# Patient Record
Sex: MALE | Race: BLACK OR AFRICAN AMERICAN | Employment: UNEMPLOYED | URBAN - METROPOLITAN AREA
[De-identification: names, ages, dates, MRNs, and addresses within clinical notes are randomized per-mention and may not be internally consistent; named-entity substitution may affect disease eponyms.]

---

## 2023-03-11 ENCOUNTER — HOSPITAL ENCOUNTER (EMERGENCY)
Age: 28
Discharge: HOME | End: 2023-03-11
Attending: STUDENT IN AN ORGANIZED HEALTH CARE EDUCATION/TRAINING PROGRAM

## 2023-03-11 VITALS
BODY MASS INDEX: 33.32 KG/M2 | SYSTOLIC BLOOD PRESSURE: 168 MMHG | RESPIRATION RATE: 16 BRPM | HEIGHT: 65 IN | DIASTOLIC BLOOD PRESSURE: 108 MMHG | OXYGEN SATURATION: 99 % | TEMPERATURE: 97.2 F | HEART RATE: 62 BPM | WEIGHT: 200 LBS

## 2023-03-11 DIAGNOSIS — Z76.89 ENCOUNTER FOR PSYCHIATRIC ASSESSMENT: Primary | ICD-10-CM

## 2023-03-11 LAB
ACETAMINOPHEN LEVEL: <5 MCG/ML (ref 10–30)
ALBUMIN SERPL-MCNC: 4.3 G/DL (ref 3.5–5.2)
ALP BLD-CCNC: 59 U/L (ref 40–129)
ALT SERPL-CCNC: 25 U/L (ref 0–40)
AMPHETAMINE SCREEN, URINE: NOT DETECTED
ANION GAP SERPL CALCULATED.3IONS-SCNC: 7 MMOL/L (ref 7–16)
AST SERPL-CCNC: 21 U/L (ref 0–39)
BARBITURATE SCREEN URINE: NOT DETECTED
BASOPHILS ABSOLUTE: 0.04 E9/L (ref 0–0.2)
BASOPHILS RELATIVE PERCENT: 0.4 % (ref 0–2)
BENZODIAZEPINE SCREEN, URINE: NOT DETECTED
BILIRUB SERPL-MCNC: 0.6 MG/DL (ref 0–1.2)
BUN BLDV-MCNC: 13 MG/DL (ref 6–20)
CALCIUM SERPL-MCNC: 9.6 MG/DL (ref 8.6–10.2)
CANNABINOID SCREEN URINE: POSITIVE
CHLORIDE BLD-SCNC: 109 MMOL/L (ref 98–107)
CO2: 26 MMOL/L (ref 22–29)
COCAINE METABOLITE SCREEN URINE: NOT DETECTED
CREAT SERPL-MCNC: 1.1 MG/DL (ref 0.7–1.2)
EOSINOPHILS ABSOLUTE: 0.73 E9/L (ref 0.05–0.5)
EOSINOPHILS RELATIVE PERCENT: 6.6 % (ref 0–6)
ETHANOL: <10 MG/DL (ref 0–0.08)
FENTANYL SCREEN, URINE: NOT DETECTED
GFR SERPL CREATININE-BSD FRML MDRD: >60 ML/MIN/1.73
GLUCOSE BLD-MCNC: 95 MG/DL (ref 74–99)
HCT VFR BLD CALC: 45.8 % (ref 37–54)
HEMOGLOBIN: 15.6 G/DL (ref 12.5–16.5)
IMMATURE GRANULOCYTES #: 0.03 E9/L
IMMATURE GRANULOCYTES %: 0.3 % (ref 0–5)
INFLUENZA A: NOT DETECTED
INFLUENZA B: NOT DETECTED
LYMPHOCYTES ABSOLUTE: 2.08 E9/L (ref 1.5–4)
LYMPHOCYTES RELATIVE PERCENT: 18.7 % (ref 20–42)
Lab: ABNORMAL
MCH RBC QN AUTO: 31.9 PG (ref 26–35)
MCHC RBC AUTO-ENTMCNC: 34.1 % (ref 32–34.5)
MCV RBC AUTO: 93.7 FL (ref 80–99.9)
METHADONE SCREEN, URINE: NOT DETECTED
MONOCYTES ABSOLUTE: 0.63 E9/L (ref 0.1–0.95)
MONOCYTES RELATIVE PERCENT: 5.7 % (ref 2–12)
NEUTROPHILS ABSOLUTE: 7.59 E9/L (ref 1.8–7.3)
NEUTROPHILS RELATIVE PERCENT: 68.3 % (ref 43–80)
OPIATE SCREEN URINE: NOT DETECTED
OXYCODONE URINE: NOT DETECTED
PDW BLD-RTO: 12.5 FL (ref 11.5–15)
PHENCYCLIDINE SCREEN URINE: NOT DETECTED
PLATELET # BLD: 245 E9/L (ref 130–450)
PMV BLD AUTO: 10.5 FL (ref 7–12)
POTASSIUM SERPL-SCNC: 4.3 MMOL/L (ref 3.5–5)
RBC # BLD: 4.89 E12/L (ref 3.8–5.8)
SALICYLATE, SERUM: <0.3 MG/DL (ref 0–30)
SARS-COV-2 RNA, RT PCR: NOT DETECTED
SODIUM BLD-SCNC: 142 MMOL/L (ref 132–146)
TOTAL PROTEIN: 7.5 G/DL (ref 6.4–8.3)
TRICYCLIC ANTIDEPRESSANTS SCREEN SERUM: NEGATIVE NG/ML
WBC # BLD: 11.1 E9/L (ref 4.5–11.5)

## 2023-03-11 PROCEDURE — 80053 COMPREHEN METABOLIC PANEL: CPT

## 2023-03-11 PROCEDURE — 82077 ASSAY SPEC XCP UR&BREATH IA: CPT

## 2023-03-11 PROCEDURE — 87636 SARSCOV2 & INF A&B AMP PRB: CPT

## 2023-03-11 PROCEDURE — 99284 EMERGENCY DEPT VISIT MOD MDM: CPT

## 2023-03-11 PROCEDURE — 80307 DRUG TEST PRSMV CHEM ANLYZR: CPT

## 2023-03-11 PROCEDURE — 80179 DRUG ASSAY SALICYLATE: CPT

## 2023-03-11 PROCEDURE — 80143 DRUG ASSAY ACETAMINOPHEN: CPT

## 2023-03-11 PROCEDURE — 85025 COMPLETE CBC W/AUTO DIFF WBC: CPT

## 2023-03-11 PROCEDURE — 93005 ELECTROCARDIOGRAM TRACING: CPT | Performed by: PHYSICIAN ASSISTANT

## 2023-03-11 RX ORDER — FLUOXETINE 10 MG/1
10 TABLET, FILM COATED ORAL DAILY
COMMUNITY

## 2023-03-11 ASSESSMENT — PAIN - FUNCTIONAL ASSESSMENT: PAIN_FUNCTIONAL_ASSESSMENT: NONE - DENIES PAIN

## 2023-03-11 NOTE — ED NOTES
Department of Emergency Medicine  FIRST PROVIDER TRIAGE NOTE             Independent MLP           3/11/23  4:50 PM EST    Date of Encounter: 3/11/23   MRN: [id number]      HPI: Jacinda Cottrell is a 32 y.o. male who presents to the ED for Other (Pt [facility]. Pt dx w/schizo-effective disorder and bipolar disorder. )     Pt presenting with medical clearance for [facility]. No SI or HI    ROS: Negative for cp or sob. PE: Gen Appearance/Constitutional: alert  HEENT: NC/NT. PERRLA,  Airway patent. Initial Plan of Care: All treatment areas with department are currently occupied. Plan to order/Initiate the following while awaiting opening in ED: labs and EKG.   Initiate Treatment-Testing, Proceed toTreatment Area When Bed Available for ED Attending/MLP to Continue Care    Electronically signed by Kaitlynn Eagle PA-C   DD: 3/11/23      Kaitlynn Eagle PA-C  03/11/23 1809

## 2023-03-11 NOTE — ED PROVIDER NOTES
[address]        Pt Name: China Spence  MRN: [id number]  [place] 1995  Date of evaluation: 3/11/2023  Provider: Cynthia Carias DO  PCP: No primary care provider on file. Note Started: 5:23 PM EST 3/11/23    CHIEF COMPLAINT       Chief Complaint   Patient presents with    Other     Pt needs [facility]. Pt dx w/schizo-effective disorder and bipolar disorder. HISTORY OF PRESENT ILLNESS: 1 or more Elements   History From: Patient    Limitations to history : None    China Spence is a 32 y.o. male with past medical history of bipolar and schizophrenia who presents to the emergency department for psychiatric evaluation. Patient states that he is trying to get medically cleared in order to enter a men shelter nearby. Patient states that he currently resides in [facility] and just moved here. Patient does have a history of mental health disorders and needed clearance in order to stay at the shelter. Patient is denying any SI, HI or auditory/visual hallucinations. Patient is on medication currently and states that he takes them as prescribed. Patient is denying any medical symptoms including nausea, vomiting, fever, chills, headache, lightheadedness, syncope, cough, congestion, sore throat, urinary symptoms, abdominal pain, urinary symptoms, constipation or diarrhea. On initial assessment, patient is well-appearing and in no acute distress. He is very cooperative on exam.    Nursing Notes were all reviewed and agreed with or any disagreements were addressed in the HPI.    ROS:   Pertinent positives and negatives are stated within HPI, all other systems reviewed and are negative.    --------------------------------------------- PAST HISTORY ---------------------------------------------  Past Medical History:  has no past medical history on file.     Past Surgical History:  has no past surgical history on file. Social History:  reports that he has been smoking cigarettes. He has never used smokeless tobacco. He reports current drug use. Drug: Marijuana Tito Blow). He reports that he does not drink alcohol. Family History: family history is not on file. The patients home medications have been reviewed. Allergies: Haldol [haloperidol], Pcn [penicillins], and Risperidone and related    ---------------------------------------------------PHYSICAL EXAM--------------------------------------    Constitutional/General: Alert and oriented x3, well appearing, non toxic in NAD  Head: Normocephalic and atraumatic  Mouth: Oropharynx clear, handling secretions, no trismus  Neck: Supple, full ROM,  Pulmonary: Lungs clear to auscultation bilaterally, no wheezes, rales, or rhonchi. Not in respiratory distress  Cardiovascular:  Regular rate. Regular rhythm. No murmurs  Chest: no chest wall tenderness  Abdomen: Soft. Non tender. Non distended. No rebound, guarding, or rigidity. No pulsatile masses appreciated. Musculoskeletal: Moves all extremities x 4. Warm and well perfused, no clubbing, cyanosis, or edema. Capillary refill <3 seconds  Skin: warm and dry. No rashes. Neurologic: GCS 15, no gross focal neurologic deficits  Psych: Normal Affect    -------------------------------------------------- RESULTS -------------------------------------------------  I have personally reviewed all laboratory and imaging results for this patient. Results are listed below.      LABS:  Results for orders placed or performed during the hospital encounter of 03/11/23   EKG 12 Lead   Result Value Ref Range    Ventricular Rate 55 BPM    Atrial Rate 55 BPM    P-R Interval 94 ms    QRS Duration 92 ms    Q-T Interval 414 ms    QTc Calculation (Bazett) 396 ms    P Axis 57 degrees    R Axis 88 degrees    T Axis 6 degrees       RADIOLOGY:   Interpretation per the Radiologist below, if available at the time of this note:    No orders to display     No results found. No results found. See preliminary interpretation by me below. EKG: This EKG is signed and interpreted by me. Sinus bradycardia with ventricular rate of 55 bpm.  Short TN interval.  QTc not prolonged. No evidence of acute STEMI. Nonspecific T wave abnormalities with inversions in the lateral leads. No previous EKG for comparison. ------------------------- NURSING NOTES AND VITALS REVIEWED ---------------------------   The nursing notes within the ED encounter and vital signs as below have been reviewed by myself. BP (!) 168/108   Pulse 62   Temp 97.2 °F (36.2 °C)   Resp 16   Ht 5' 5\" (1.651 m)   Wt 200 lb (90.7 kg)   SpO2 99%   BMI 33.28 kg/m²   Oxygen Saturation Interpretation: Normal    The patients available past medical records and past encounters were reviewed. ------------------------------ ED COURSE/MEDICAL DECISION MAKING----------------------  Medications - No data to display    Medical Decision Making/Differential Diagnosis:    CC/HPI Summary, Pertinent Physical Exam Findings, Social Determinants of health, Records Reviewed, DDx, testing done/not done, ED Course, Reassessment, disposition considerations/shared decision making with patient, consults, disposition:        Medical Decision Making:   I, Dr. Justino Ty am the resident physician of record. History From: Patient    Limitations to history : None     Delmer Ryan is a 32 y.o. male who presents to the ED for psychiatric evaluation. Vital signs upon arrival BP (!) 168/108   Pulse 62   Temp 97.2 °F (36.2 °C)   Resp 16   Ht 5' 5\" (1.651 m)   Wt 200 lb (90.7 kg)   SpO2 99%   BMI 33.28 kg/m²     On initial evaluation, patient is nontoxic-appearing, afebrile, hemodynamically stable and in no acute distress. Initial vitals within normal limits.  Differential diagnosis includes but is not limited drug/alcohol toxicity, major depressive episode, manic episode, psychosis or mood disorder. Physical exam was essentially benign. Lungs are clear to auscultation with no wheezes, rales or rhonchi. Abdomen soft, nontender nondistended. Heart regular rate with normal rhythm. No significant pretibial edema. No other concerning physical exam findings. Work-up in the emergency department was unremarkable. Work-up as interpreted by me include CBC with no leukocytosis, left shift or significant anemia. WBC 11.1 and hemoglobin stable at 15.6. Platelet count normal at 245. CMP unremarkable with stable renal function, creatinine 1.1 and BUN 13. No major electrolyte abnormalities including normal potassium of 4.3 and sodium of 142. LFTs within normal limits. Viral testing negative for COVID and influenza. Serum drug screen with unremarkable levels of ethanol, acetaminophen and salicylates. TCA negative. Urine drug screen positive for cannabinoids but no other substances. EKG sinus and nonspecific with no acute ischemic changes. Patient was medically cleared for behavioral health assessment. Patient was seen by social work team and did not meet criteria for inpatient care. Okay for discharge to shelter. Patient was agreeable with this plan as well after shared decision making. He was discharged in stable condition. No imaging studies indicated for this visit. Discussion with Other Profesionals :    Independent assessment by social work and psychiatry team with recommendations in  separate notes     Social Determinants : Patient is Homeless    Records Reviewed : Care Everywhere note from 6/17/2014 reviewed. Patient presented to [facility] ED for back pain and depression. Noted that patient used to cut himself with scars on his forearms present. Patient was prescribed naproxen and Norco for his back pain. Discharged.     Chronic conditions: Bipolar disorder and schizophrenia    CONSULTS: Social work and psych team      Disposition:   Appropriate for outpatient management      Pt will be d/c and will follow up with his PCP . He is educated on signs and symptoms that require emergent evaluation. Pt is advised to return to the ED if his symptoms change or worsen. If his pain persists, pt may need further evaluation. Pt is agreeable to plan and all questions have been answered at this time. 1. Encounter for psychiatric assessment          This patient's ED course included: History, physical examination, reevaluation prior to disposition    This patient has remained hemodynamically stable during their ED course. Counseling: The emergency provider has spoken with the patient and discussed todays results, in addition to providing specific details for the plan of care and counseling regarding the diagnosis and prognosis. Questions are answered at this time and they are agreeable with the plan.       --------------------------------- IMPRESSION AND DISPOSITION ---------------------------------    IMPRESSION  1. Encounter for psychiatric assessment        DISPOSITION  Disposition: Discharge  Patient condition is stable        NOTE: This report was transcribed using voice recognition software.  Every effort was made to ensure accuracy; however, inadvertent computerized transcription errors may be present         DO Alanis Richardson  03/11/23 4925

## 2023-03-12 LAB
EKG ATRIAL RATE: 55 BPM
EKG P AXIS: 57 DEGREES
EKG P-R INTERVAL: 94 MS
EKG Q-T INTERVAL: 414 MS
EKG QRS DURATION: 92 MS
EKG QTC CALCULATION (BAZETT): 396 MS
EKG R AXIS: 88 DEGREES
EKG T AXIS: 6 DEGREES
EKG VENTRICULAR RATE: 55 BPM

## 2023-03-12 NOTE — ED NOTES
After pt exam by Dr Carmelina Cardoso it was requested that SW talk with pt to determine what was needed for this pt. as he does not present as in need of psychiatric care at this time. The pt presented reporting he was told he was in need of medical clearance so that he could go to a shelter. Pt states he has no desire to go to a shelter, that he wants to return to [facility] where he has a place to live. Reports he is on psychiatric medication (Prozac, Abilify injection) and that he has been diagnosed with Bipolar Disorder by his provider in [facility]. Denies that he has any hx of in-patient psychiatric hospitalization. Overall presents a somewhat rambling story of being from [facility], coming here to [facility] where he was going to live with his baby momma. Reports his child is 3 yr old, also reports he is going to work at [employer] a local [employer]. Pt is unable to present a clear narrative of what lead up to his being here at the ED today. Initially reported he went to [facility] ED for a COVID test and they sent him here. Then reports he was sent from the hospital to the [facility] and they sent him here for medical clearance. At one point reports police brought him here and later stated that [facility] staff brought him here. Pt has been in a donato chair since 1650 without agitation, cooperative here in ED. Mental status is unremarkable, mood stable, affect within normal limits, eye contact good, speech normal in rate flow and volume, thought form appears logical, thought content appears clear, denies A/V hallucinations, delusions, paranoia, none evident in interview. Denies suicidal ideation intent, plan, hx of attempts, denies homicidal ideation intent or plan, denies thoughts of hurting others. A call was placed to [facility] ED who report pt has not been a pt there. No emergency contacts are listed for pt.  Reports he has no family here in this area except his baby momma and child.     Discussion with Dr Shon Olszewski. Mental status unremarkable, does not meet criteria for either in-patient hospitalization or crisis unit. Declines referral to either [facility] or [facility]. It was discussed to elvis pt request for a taxi voucher in order to return to [place]. This was done at Dr Rowland's request. Pt given voucher, taxi called, pt placed in lobby to await taxi.       [address] [facility], [facility] [place] [place]  03/11/23 9813

## 2023-03-12 NOTE — PROGRESS NOTES
Patient came in for psychiatric assessment. He states that he is trying to get medical clearance to stay at a shelter. Denying any SI, HI or auditory/visual hallucinations. Does have a history of mental health disorders. Indicated labs and test ordered. Vitals:    03/11/23 1635 03/11/23 1654   BP:  (!) 168/108   Pulse: 93 62   Resp:  16   Temp: 97.2 °F (36.2 °C)    SpO2: 100% 99%   Weight:  200 lb (90.7 kg)   Height:  5' 5\" (1.651 m)        Work-up was reviewed. Patient is medically cleared for behavioral health evaluation. Final note to follow.     The patient was seen and evaluated by myself and Dr. Laron Russell DO PGY-2  3/11/2023  8:49 PM

## 2023-03-13 PROCEDURE — 93010 ELECTROCARDIOGRAM REPORT: CPT | Performed by: INTERNAL MEDICINE
